# Patient Record
Sex: MALE | Race: WHITE | NOT HISPANIC OR LATINO | URBAN - METROPOLITAN AREA
[De-identification: names, ages, dates, MRNs, and addresses within clinical notes are randomized per-mention and may not be internally consistent; named-entity substitution may affect disease eponyms.]

---

## 2020-03-27 ENCOUNTER — OFFICE VISIT CONVERTED (OUTPATIENT)
Dept: PULMONOLOGY | Facility: CLINIC | Age: 40
End: 2020-03-27
Attending: INTERNAL MEDICINE

## 2020-09-08 ENCOUNTER — HOSPITAL ENCOUNTER (OUTPATIENT)
Dept: PREADMISSION TESTING | Facility: HOSPITAL | Age: 40
Discharge: HOME | End: 2020-09-08
Attending: INTERNAL MEDICINE

## 2020-09-08 LAB — SARS-COV-2 RNA SPEC QL NAA+PROBE: NOT DETECTED

## 2020-09-10 ENCOUNTER — HOSPITAL ENCOUNTER (OUTPATIENT)
Dept: CARDIOLOGY | Facility: HOSPITAL | Age: 40
Discharge: HOME | End: 2020-09-10
Attending: INTERNAL MEDICINE

## 2020-09-18 ENCOUNTER — OFFICE VISIT CONVERTED (OUTPATIENT)
Dept: PULMONOLOGY | Facility: CLINIC | Age: 40
End: 2020-09-18
Attending: NURSE PRACTITIONER

## 2020-12-30 ENCOUNTER — OFFICE VISIT CONVERTED (OUTPATIENT)
Dept: PULMONOLOGY | Facility: CLINIC | Age: 40
End: 2020-12-30
Attending: NURSE PRACTITIONER

## 2021-05-28 VITALS
TEMPERATURE: 96.9 F | WEIGHT: 220 LBS | HEART RATE: 52 BPM | SYSTOLIC BLOOD PRESSURE: 130 MMHG | RESPIRATION RATE: 12 BRPM | HEIGHT: 68 IN | DIASTOLIC BLOOD PRESSURE: 78 MMHG | BODY MASS INDEX: 33.34 KG/M2 | OXYGEN SATURATION: 97 %

## 2021-05-28 NOTE — PROGRESS NOTES
Patient: KRISTEL GONZALES     Acct: [id number]     Report: #[id number]  UNIT #: [id number]     : 1980    Encounter Date:2020  PRIMARY CARE: VIKI GAXIOLA  ***Signed***  --------------------------------------------------------------------------------------------------------------------  History of Present Illness      Chief Complaint: F/U, Pulmonary Nodule            Kristel Gonzales is presenting for evaluation via Telehealth visit. Verbal consent     obtained before beginning visit.            PAST MEDICAL HISTORY/OVERVIEW OF PATIENT SYMPTOMS            Symptoms: No Symptoms             Any known Exposure to COVID-19: NO            Former smoker (Started smoking 15 years old, 1 ppd x 20 years/ quit 1 year ago)            Provider spent 13 minutes with the patient during telehealth visit.            The following staff were present during this visit: Elissa Saucedo CMA, Tyra BRAN             The patient is a 40 year old male patient of Dr. Gomez's who underwent a CT     scan of the chest at [facility] on 2019 showing a right upper lobe    subcentimeter noncalcified pulmonary nodule. It also showed some mildly enlarged    anterior mediastinal lymph nodes recommending follow up and some mild     emphysematous changes. Unfortunately we still do not have the disc to review.     Multiple CT scans were ordered to follow up on this however the patient's     insurance keeps denying the scans. The patient is a former cigarettes smoker and    started smoking at age 15. The patient reports he smoked 1 pack per day for     about 20 years. The patient denies any history of any malignancies or lung     cancer. The patient reports his parents suffered from asthma as  children and     and denies any family history of lung cancer. The patient states he uses     albuterol inhaler as needed and does not want a daily inhaler at this time. The     patient states he did have a chest x-ray performed  at [facility]     because he will be having a urachal cyst removed from his bladder.  The patient     states he was told it will be an 8 week recovery period. The patient states at     time he does get short of breath that is mild in severity, worse with exertion,     improved with rest. The patient states that dust or pollen will aggravate his     symptoms as well. The patient denies any cough, wheezing,  fever or chills,     night sweats, hemoptysis,  purulent sputum production, swollen glands in head     and neck, unintentional weight loss, chest pain or chest tightness, abdominal     pain, nausea or vomiting or diarrhea. The patient denies  any headaches,     myalgias, sore throat, changes in sense of taste and smell any coronavirus or     flu like symptoms. The patient states he is able to perform his activities of     daily living without difficulty.  The patient denies any unintentional weight     loss.            I reviewed the Review of Systems, medical, surgical and family history and agree    with those as entered.               Physical exam is deferred due to Telehealth visit.            I personally reviewed my last Telehealth visit  note.                           Allergies and Medications      Allergies:        Coded Allergies:             PENICILLINS (Verified  Allergy, Intermediate, 12/30/20)                  facial swells, hives      Medications    Last Reconciled on 12/30/20 09:14 by HUGH KRAMER-Albuterol (Ventolin HFA) 8 Gm Hfa.aer.ad      1 PUFFS INH Q6H PRN for SHORTNESS OF BREATH/WHEEZING, #1 MDI 0 Refills         Prov: ROSA CARDOZA [facility]         12/30/20       Lansoprazole (Lansoprazole) 30 Mg Capsule.      30 MG PO QDAY, CAP         Reported         3/27/20       Diclofenac Sodium (Voltaren 1%*) 100 Gm Gel..gram.      1 APL TOPICAL QDAY, #1 TUBE         Reported         3/27/20       Fenofibrate (Fenofibrate*) 48 Mg Tablet      48 MG PO QDAY, TAB          Reported         3/27/20       Niacin (Niacin) 100 Mg Tablet      100 MG PO QDAY, TAB         Reported         3/27/20       Ezetimibe (Zetia) 10 Mg Tablet      10 MG PO QDAY, #30 TAB 0 Refills         Reported         3/27/20       Aspirin (Aspirin) 325 Mg Tablet      325 MG PO QDAY PRN for MILD PAIN (1-3)/FEVER, #100 TAB 0 Refills         Reported         3/27/20            Plan      Orders:  Phone Eval 11-20 mi [id number]      Instructions      * Chronic conditions reviewed and taken in consideration for today's treatment       plan.      * Plan Of Care: ()      * Patient instructed to seek medical attention urgently for new or worsening       symptoms.      * Patient was educated/instructed on their diagnosis, treatment and medications       today.      * Recommend self monitoring. Instructions given.      * Recommend self quarantine for 14 days.      * Recommend self quarantine until without fever for 72 hours without using fever       reducing medications.      * Recommends over the counter medications for symptom management.            ASSESSMENT:      1. Dyspnea on exertion at baseline.       2. Intermittent chronic cough.      3. Pulmonary nodule.      4. Mildly enlarged anterior mediastinal lymph node.       5. Tobacco abuse of cigarettes in remission for 1 year, the patient reports he     has a 20 pack year smoking history.              PLAN:      1. Continue albuterol inhaler as needed. The patient is advised if he has to use     his albuterol inhaler 2-3 times a week to notify our office and we will have to     step up therapy.      2. The patient had a normal pulmonary function test. I recommend the patient had     a methacholine challenge test however the patient would like to wait until     later on next year.       3. The patient was supposed to have a repeat CT scan of the chest and this had     been ordered on multiple occasions but unfortunately insurance keeps denying     this for follow up on the  patient's pulmonary nodule and mediastinal     lymphadenopathy. I will speak with our prior authorization specialist to see if     we can get the CT scan approved.       4. The patient is advised to call the office, call 911 or go to the ER for any     new or worsening symptoms.       5. The patient refuses all flu and pneumonia vaccines. Risk of refusing vaccines     was discussed with the patient and the patient verbalized understanding. I did     advise the patient to get the COVID-19 vaccine when it is available. The patient     is advised to follow CDC recommendations such as social distancing, wearing a     mask and washing hand for at least 20 seconds.      6. Follow up in 4-5 months, sooner if needed.            Electronically signed by ROSA CARDOZA [facility]  12/31/2020 10:47       Disclaimer: Converted document may not contain table formatting or lab diagrams. Please see [employer] [facility] for the authenticated document.

## 2022-08-21 NOTE — PROGRESS NOTES
Patient: KRISTEL GONZALES     Acct: [id number]     Report: #[id number]  UNIT #: [id number]     : 1980    Encounter Date:2020  PRIMARY CARE: VIKI DICKSON  ***Signed***  --------------------------------------------------------------------------------------------------------------------  History of Present Illness      Chief Complaint: F/U, Cough, Dyspnea             Kristel Gonzales is presenting for evaluation via Telehealth visit. Verbal consent     obtained before beginning visit.            PAST MEDICAL HISTORY/OVERVIEW OF PATIENT SYMPTOMS            Symptoms: None            Any known Exposure to COVID-19: No              Former smoker (Started smoking 15 years old, 1 ppd x 20 years/ quit 1 year ago)            Provider spent 14 minutes with the patient during telehealth visit.            The following staff were present during this visit: Elissa Saucedo CMA, Tyra BRAN             The patient is a 40 year old male patient of Dr. Gomez's who was referred to     our office by Dr. Viki Dickson. The patient underwent a CT scan at [facility] on 19 which showed a right upper lobe subcentimeter noncalcified     pulmonary nodule. It also showed mildly enlarged anterior mediastinal lymph node    recommending follow up and some mild emphysematous changes. Unfortunately we do     not have a disc for review. The patient is a former cigarettes smoker and     started smoking at age 15. The patient smoked 1 pack per day for about 20 years.    The patient denies any history of any malignancies or lung cancer. The patient     reports his parents suffered from  asthma as children and denies any family     history of lung cancer. The patient was started on anoro last visit however the     patient states he is not using an inhaler, the patient states he is only using a    rescue inhaler and only uses it if he is doing heavy exertion or if he outside     around the pollen and if his  allergies act up. The patient states he does not     want to use a daily inhaler at this time. The patient denies any fever or     chills, night sweats, hemoptysis,  purulent sputum production, swollen glands in    head and neck, unintentional weight loss, chest pain or chest tightness,     abdominal pain, nausea or vomiting or diarrhea. The patient denies  any     headaches, myalgias, changes in sense of taste and smell any coronavirus or flu     like symptoms. The patient states when he is walking around at a normal pace he     does not get short of breath at all but the patient states that if he has to     walk quickly, run or go up stairs he will get short of breath. The patient     states he is able to perform his activities of daily living without difficulty.     The patient had a pulmonary function test on 09/10/2020 that came back normal.     He had an FEV1 of 93% predicted, FEV1/FVC ratio was 102% predicted, total lung     capacity was 89% predicted and diffusion capacity was 84% predicted. The patient    was supposed to have a repeat CT scan of the chest however unfortunately     insurance denied this and he wants to see if our office can get this approved.     The patient denies unintentional weight loss.             I reviewed the Review of Systems, medical, surgical and family history and agree    with those as entered.               Physical exam is deferred due to Telehealth visit.            I personally reviewed Dr. Parks's last office note.             Patient: KRISTEL GONZALES   Acct: #[id number]   Report: #[id number]            UNIT #: [id number]    DOS:       LOCATION:[facility]     : 1980            PROVIDERS      ADMITTING:     FAMILY:  MANUEL HERRERA         OTHER:       DICTATING:  BEKA PARKS DO            REASON FOR VISIT:  PERSONAL HX OF NICOTINE DEPENDENCE            *******Signed******                                     [facility]  [employer]                            [place] Summers  [id number]               __________________________________________________________________________             Patient Name:                   Attending Physician:      Joseph Bernstein D.O.             Patient Visit # MR #            Admit Date  Disch Date     Location      [id number]    [id number]      09/10/2020                 [facility]             Date of Birth      1980      __________________________________________________________________________      0821 - DIAGNOSTIC REPORT             PULMONARY FUNCTION TEST             DATE OF SERVICE:      09/10/2020.             SPIROMETRY:      FEV1/FVC ratio is 83.      FEV1 is 95% of predicted, 3.77 L.      FVC is 91% of predicted, 4.55 L.             LUNG VOLUMES:      Total lung capacity is 89% of predicted, 5.85 L.      Residual volume is 76% of predicted, 1.33 L.             DIFFUSION CAPACITY:      DLCO is 84% of predicted.             COMPARISON:      No previous pulmonary function test for comparison.             CONCLUSION:      1.  Normal spirometry without obstructive defect.      2.  Lung volumes are normal.      3.  Normal diffusion capacity.  Correlate clinically.             To be electronically signed in [place]      [id number] BEKA PARKS D.O.             KM:meghann      D:  09/21/2020 08:53      T:  09/21/2020 09:32      #[id number]             Until signed, this is an unconfirmed preliminary report that may contain      errors and is subject to change.                   Until signed, this is an unconfirmed preliminary report that may contain      errors and is subject to change.                     <Electronically signed by BEKA PARKS DO>                09/21/20 1715               BEKA PARKS:ADRIANA      D:09/21/20 0853            Allergies and Medications       Allergies:        Coded Allergies:             PENICILLINS (Verified  Allergy, Intermediate, 9/18/20)                  facial swells, hives      Medications    Last Reconciled on 9/18/20 10:18 by ROSA CARDOZA,       Umeclidinium/Vilanterol 62.5-25 Mcg Inh (Anoro Ellipta 62.5-25 Mcg Inh) 1 Each     Blst.w.dev               Prov: CHARLYBEKA         3/27/20       MDI-Albuterol (Ventolin HFA) 8 Gm Hfa.aer.ad      1 PUFFS INH Q6H PRN for SHORTNESS OF BREATH/WHEEZING, #1 MDI 0 Refills         Prov: CHARLYBEKA         3/27/20       Lansoprazole (Lansoprazole) 30 Mg Capsule.dr      30 MG PO QDAY, CAP         Reported         3/27/20       Diclofenac Sodium (Voltaren 1%*) 100 Gm Gel..gram.      1 APL TOPICAL QDAY, #1 TUBE         Reported         3/27/20       Fenofibrate (Fenofibrate*) 48 Mg Tablet      48 MG PO QDAY, TAB         Reported         3/27/20       Niacin (Niacin) 100 Mg Tablet      100 MG PO QDAY, TAB         Reported         3/27/20       Nebivolol HCl (BYSTOLIC) 5 Mg Tablet      5 MG PO QDAY, #30 TAB         Reported         3/27/20       Ezetimibe (Zetia) 10 Mg Tablet      10 MG PO QDAY, #30 TAB 0 Refills         Reported         3/27/20       Aspirin (Aspirin) 325 Mg Tablet      325 MG PO QDAY PRN for MILD PAIN (1-3)/FEVER, #100 TAB 0 Refills         Reported         3/27/20            Plan      Orders:  Phone Eval 11-20 mi [id number]      Instructions      * Chronic conditions reviewed and taken in consideration for today's treatment       plan.      * Plan Of Care: ()      * Patient instructed to seek medical attention urgently for new or worsening       symptoms.      * Patient was educated/instructed on their diagnosis, treatment and medications       today.      * Recommend self monitoring. Instructions given.      * Recommend self quarantine for 14 days.      * Recommend self quarantine until without fever for 72 hours without using fever       reducing medications.      * Recommends over the  counter medications for symptom management.            ASSESSMENT:      1. Dyspnea on exertion.      2. Intermittent chronic cough.       3. Wheezing improved per patient report.       4. Tobacco abuse of cigarettes in remission for 1 year, the patient has a 20     pack year smoking history.       5. Pulmonary nodule.       6. Mildly enlarged anterior mediastinal lymph node.             PLAN:      1. The patient was supposed to have a repeat CT scan of the chest unfortunately     insurance denied this. I will speak with our prior authorization specialist and     see if we can get this CT scan approved.       2. At this time the patient is refusing to use a daily inhaler. Continue     albuterol as needed. The patient is advised if he has to use his albuterol     inhaler 3 or more times a week to notify our office and we will have to step up     therapy.       3. The patient had normal pulmonary function test. I recommended the patient     have a methacholine challenge test however the patient refuses at this time and     states he would like to wait.       4. The patient is advised to call the office, call 911 or go to the ER for any     new or worsening symptoms.       5. The patient declines all flu and pneumonia vaccines. Risks of refusing     vaccines was discussed with the patient and he verbalized understanding.       6. Follow up with Dr. Gomez in 2-3 months, sooner if needed.            Electronically signed by ROSA CARDOZA [facility]  09/23/2020 12:43       Disclaimer: Converted document may not contain table formatting or lab diagrams. Please see [employer] for the authenticated document.   Yes